# Patient Record
Sex: MALE | Race: WHITE | NOT HISPANIC OR LATINO | Employment: OTHER | URBAN - METROPOLITAN AREA
[De-identification: names, ages, dates, MRNs, and addresses within clinical notes are randomized per-mention and may not be internally consistent; named-entity substitution may affect disease eponyms.]

---

## 2019-07-31 ENCOUNTER — OFFICE VISIT (OUTPATIENT)
Dept: UROLOGY | Facility: MEDICAL CENTER | Age: 84
End: 2019-07-31
Payer: MEDICARE

## 2019-07-31 VITALS
HEIGHT: 73 IN | DIASTOLIC BLOOD PRESSURE: 80 MMHG | BODY MASS INDEX: 27.96 KG/M2 | WEIGHT: 211 LBS | SYSTOLIC BLOOD PRESSURE: 120 MMHG | HEART RATE: 63 BPM

## 2019-07-31 DIAGNOSIS — Z41.2 ENCOUNTER FOR CIRCUMCISION: ICD-10-CM

## 2019-07-31 DIAGNOSIS — B02.9 HERPES ZOSTER WITHOUT COMPLICATION: Primary | ICD-10-CM

## 2019-07-31 DIAGNOSIS — N48.9 PENILE LESION: ICD-10-CM

## 2019-07-31 LAB
SL AMB  POCT GLUCOSE, UA: ABNORMAL
SL AMB LEUKOCYTE ESTERASE,UA: ABNORMAL
SL AMB POCT BILIRUBIN,UA: ABNORMAL
SL AMB POCT BLOOD,UA: ABNORMAL
SL AMB POCT CLARITY,UA: CLEAR
SL AMB POCT COLOR,UA: YELLOW
SL AMB POCT KETONES,UA: ABNORMAL
SL AMB POCT NITRITE,UA: ABNORMAL
SL AMB POCT PH,UA: 5
SL AMB POCT SPECIFIC GRAVITY,UA: 1.02
SL AMB POCT URINE PROTEIN: ABNORMAL
SL AMB POCT UROBILINOGEN: 0.2

## 2019-07-31 PROCEDURE — 99203 OFFICE O/P NEW LOW 30 MIN: CPT | Performed by: UROLOGY

## 2019-07-31 PROCEDURE — 81003 URINALYSIS AUTO W/O SCOPE: CPT | Performed by: UROLOGY

## 2019-07-31 RX ORDER — VALACYCLOVIR HYDROCHLORIDE 1 G/1
1000 TABLET, FILM COATED ORAL EVERY 8 HOURS
Qty: 21 TABLET | Refills: 1 | Status: SHIPPED | OUTPATIENT
Start: 2019-07-31 | End: 2019-08-07

## 2019-07-31 RX ORDER — ATORVASTATIN CALCIUM 10 MG/1
10 TABLET, FILM COATED ORAL
COMMUNITY
Start: 2012-09-20 | End: 2019-07-31 | Stop reason: ALTCHOICE

## 2019-07-31 RX ORDER — SIMVASTATIN 20 MG
TABLET ORAL
COMMUNITY
Start: 2019-01-26 | End: 2019-07-31 | Stop reason: SDUPTHER

## 2019-07-31 RX ORDER — LATANOPROST 50 UG/ML
SOLUTION/ DROPS OPHTHALMIC
COMMUNITY
Start: 2019-06-10

## 2019-07-31 RX ORDER — LOSARTAN POTASSIUM 50 MG/1
TABLET ORAL
COMMUNITY

## 2019-07-31 RX ORDER — FAMOTIDINE 40 MG/1
TABLET, FILM COATED ORAL
COMMUNITY
Start: 2019-02-11 | End: 2019-07-31 | Stop reason: ALTCHOICE

## 2019-07-31 RX ORDER — PANTOPRAZOLE SODIUM 40 MG/1
TABLET, DELAYED RELEASE ORAL
COMMUNITY
Start: 2019-07-30

## 2019-07-31 RX ORDER — SIMVASTATIN 20 MG
TABLET ORAL
COMMUNITY
Start: 2019-05-16

## 2019-07-31 RX ORDER — LOSARTAN POTASSIUM AND HYDROCHLOROTHIAZIDE 12.5; 5 MG/1; MG/1
TABLET ORAL
COMMUNITY
Start: 2012-09-20

## 2019-07-31 NOTE — ASSESSMENT & PLAN NOTE
Based upon the history that this lesion always occurs in exactly the same place and that the patient is subject tissue angles, I think this may be a manifestation of shingles at this location  I will place the patient on valacyclovir  If the lesion heals, this will be an indication that this is shingles  A circumcision probably will not be necessary  The patient will call in 1 week with a progress report and return p r n  If the lesion does not heal, we can schedule him for circumcision then

## 2019-07-31 NOTE — PROGRESS NOTES
Assessment/Plan:    Herpes zoster without complication  Based upon the history that this lesion always occurs in exactly the same place and that the patient is subject tissue angles, I think this may be a manifestation of shingles at this location  I will place the patient on valacyclovir  If the lesion heals, this will be an indication that this is shingles  A circumcision probably will not be necessary  The patient will call in 1 week with a progress report and return p r n  If the lesion does not heal, we can schedule him for circumcision then  Diagnoses and all orders for this visit:    Herpes zoster without complication  -     valACYclovir (VALTREX) 1,000 mg tablet; Take 1 tablet (1,000 mg total) by mouth every 8 (eight) hours for 7 days    Encounter for circumcision  -     POCT urine dip auto non-scope    Penile lesion          Subjective:      Patient ID: Jaqueline Garcia is a 82 y o  male  HPI  Penile lesion:  Pt seen by Dr Linette Wayne for painless ulcer of prepuce in Dec 2012  It healed with Bactroban  It has recurred in the same spot a few times since then  Currently, the patient has an irritated ulcerated area on the foreskin  It began as a slightly sore irritated area, then the overlying skin sloughed without formation of a vesical and he is left with a small ulceration he has today  In the past, the patient was seen by Dr Linette Wayne  He was told then that his problem could be alleviated with a circumcision  He would like a circumcision now  Note the patient is subject to shingles on the right side of his thorax although he has not had a recurrence in a long time  He has receive the shingles vaccine  The patient's medical doctor is in [facility] and he examined the patient's prostate annually      The following portions of the patient's history were reviewed and updated as appropriate: allergies, current medications, past family history, past medical history, past social history, past surgical history and problem list     Review of Systems   Constitutional: Negative for activity change and fatigue  Respiratory: Negative for shortness of breath and wheezing  Cardiovascular: Negative for chest pain  Hypertension  Gastrointestinal: Negative for abdominal pain  Genitourinary: Negative for difficulty urinating, dysuria, frequency, hematuria and urgency  Musculoskeletal: Negative for back pain and gait problem  Skin: Negative  Allergic/Immunologic: Negative  Neurological: Negative  Psychiatric/Behavioral: Negative  Objective:      /80   Pulse 63   Ht 6' 1" (1 854 m)   Wt 95 7 kg (211 lb)   BMI 27 84 kg/m²          Physical Exam   Constitutional: He is oriented to person, place, and time  He appears well-developed and well-nourished  HENT:   Head: Normocephalic and atraumatic  Eyes: EOM are normal    Neck: Normal range of motion  Neck supple  Cardiovascular: Normal rate, regular rhythm and normal heart sounds  Pulmonary/Chest: Effort normal and breath sounds normal    Abdominal: Soft  There is no tenderness  Genitourinary:   Genitourinary Comments: On the outer aspect of the foreskin, there is a 3 mm ulcerated lesion  The margins are not indurated  Lesion is slightly tender  Digital rectal exam was not performed because it is done by the patient's PCP  Musculoskeletal: Normal range of motion  Neurological: He is alert and oriented to person, place, and time  Skin: Skin is warm and dry  Psychiatric: He has a normal mood and affect   His behavior is normal  Judgment and thought content normal